# Patient Record
Sex: MALE
[De-identification: names, ages, dates, MRNs, and addresses within clinical notes are randomized per-mention and may not be internally consistent; named-entity substitution may affect disease eponyms.]

---

## 2020-11-18 ENCOUNTER — HOSPITAL ENCOUNTER (EMERGENCY)
Dept: HOSPITAL 97 - ER | Age: 30
Discharge: HOME | End: 2020-11-18
Payer: OTHER GOVERNMENT

## 2020-11-18 VITALS — OXYGEN SATURATION: 99 % | TEMPERATURE: 98.3 F | DIASTOLIC BLOOD PRESSURE: 99 MMHG | SYSTOLIC BLOOD PRESSURE: 136 MMHG
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
## 2020-11-18 DIAGNOSIS — S02.641A: Primary | ICD-10-CM

## 2020-11-18 DIAGNOSIS — S02.85XA: ICD-10-CM
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
## 2020-11-18 PROCEDURE — 70486 CT MAXILLOFACIAL W/O DYE: CPT

## 2020-11-18 PROCEDURE — 76377 3D RENDER W/INTRP POSTPROCES: CPT

## 2020-11-18 PROCEDURE — 72125 CT NECK SPINE W/O DYE: CPT

## 2020-11-18 PROCEDURE — 99283 EMERGENCY DEPT VISIT LOW MDM: CPT

## 2020-11-18 PROCEDURE — 70450 CT HEAD/BRAIN W/O DYE: CPT
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
## 2020-11-18 NOTE — EDPHYS
Physician Documentation                                                                           

 [facility]                                                                 

Name: Lucas Soto                                                                                

Age: 30 yrs                                                                                       

Sex: Male                                                                                         

: 1990                                                                                   

MRN: [id number]                                                                                   

Arrival Date: 2020                                                                          

Time: 13:11                                                                                       

Account#: [id number]                                                                            

Bed 29                                                                                            

Private MD:                                                                                       

ED Physician Jason Caldwell                                                                         

HPI:                                                                                              

                                                                                             

15:58 This 30 yrs old  Male presents to ER via Ambulatory with complaints of Facial   kb  

      Injury, Facial Swelling.                                                                    

15:58 The patient or guardian reports decreased movement, pain, swelling, tenderness. The     kb  

      complaints affect the right side of head. Context of injury: The problem was sustained      

      at work, resulted from a direct blow, a heavy object. Onset: The symptoms/episode           

      began/occurred 6 day(s) ago. Associated signs and symptoms: Loss of consciousness: This     

      patient did not experience any loss of consciousness. Pertinent positives: injury.          

      Severity of symptoms: At their worst the symptoms were moderate, in the emergency           

      department the symptoms are unchanged. The patient has not experienced similar symptoms     

      in the past. The patient has not recently seen a physician.                                 

                                                                                                  

Historical:                                                                                       

- Allergies:                                                                                      

13:21 No Known Allergies;                                                                     dm5 

                                                                                                  

                                                                                                  

                                                                                                  

ROS:                                                                                              

15:55 Constitutional: Negative for fever, chills, and weight loss, Cardiovascular: Negative   kb  

      for chest pain, palpitations, and edema, Respiratory: Negative for shortness of breath,     

      cough, wheezing, and pleuritic chest pain, Abdomen/GI: Negative for abdominal pain,         

      nausea, vomiting, diarrhea, and constipation, Back: Negative for injury and pain,           

      MS/Extremity: Negative for injury and deformity, Skin: Negative for injury, rash, and       

      discoloration, Neuro: Negative for headache, weakness, numbness, tingling, and seizure.     

15:55 ENT: Positive for right facial pain.                                                        

                                                                                                  

Exam:                                                                                             

15:55 Constitutional:  This is a well developed, well nourished patient who is awake, alert,  kb  

      and in no acute distress. Chest/axilla:  Normal chest wall appearance and motion.           

      Nontender with no deformity.  No lesions are appreciated. Cardiovascular:  Regular rate     

      and rhythm with a normal S1 and S2.  No gallops, murmurs, or rubs.  Normal PMI, no JVD.     

       No pulse deficits. Respiratory:  Lungs have equal breath sounds bilaterally, clear to      

      auscultation and percussion.  No rales, rhonchi or wheezes noted.  No increased work of     

      breathing, no retractions or nasal flaring. Abdomen/GI:  Soft, non-tender, with normal      

      bowel sounds.  No distension or tympany.  No guarding or rebound.  No evidence of           

      tenderness throughout. Back:  No spinal tenderness.  No costovertebral tenderness.          

      Full range of motion. Skin:  Warm, dry with normal turgor.  Normal color with no            

      rashes, no lesions, and no evidence of cellulitis. MS/ Extremity:  Pulses equal, no         

      cyanosis.  Neurovascular intact.  Full, normal range of motion. Neuro:  Awake and           

      alert, GCS 15, oriented to person, place, time, and situation.  Cranial nerves II-XII       

      grossly intact.  Motor strength 5/5 in all extremities.  Sensory grossly intact.            

      Cerebellar exam normal.  Normal gait.                                                       

15:55 Head/face: Noted is no obvious of injury or deformity except swelling, that is mild,        

      that is moderate, of the  right side of head, tenderness, that is moderate, of the          

      right side of head.                                                                         

                                                                                                  

Vital Signs:                                                                                      

13:18  / 99; Pulse 89; Resp 20; Temp 98.3; Pulse Ox 99% ; Weight 104.33 kg; Height 5    dm5 

      ft. 11 in. (180.34 cm); Pain 5/10;                                                          

13:18 Body Mass Index 32.08 (104.33 kg, 180.34 cm)                                            dm5 

                                                                                                  

Sydnie Coma Score:                                                                               

15:45 Eye Response: spontaneous(4). Verbal Response: oriented(5). Motor Response: obeys       kb  

      commands(6). Total: 15.                                                                     

15:58 Eye Response: spontaneous(4). Verbal Response: oriented(5). Motor Response: obeys       kb  

      commands(6). Total: 15.                                                                     

                                                                                                  

MDM:                                                                                              

15:40 Patient medically screened.                                                             kb  

15:45 Data reviewed: vital signs, nurses notes. Data interpreted: Pulse oximetry: on room air kb  

      is 99 %. Interpretation: normal. Counseling: I had a detailed discussion with the           

      patient and/or guardian regarding: the historical points, exam findings, and any            

      diagnostic results supporting the discharge/admit diagnosis, radiology results, the         

      need for outpatient follow up, OMF, to return to the emergency department if symptoms       

      worsen or persist or if there are any questions or concerns that arise at home.             

                                                                                                  

                                                                                             

13:22 Order name: Facial Bones W/O Con CT; Complete Time: 14:31                               dm5 

                                                                                             

13:39 Order name: Head C Spine Mpr Wo Con; Complete Time: 14:31                               EDMS

                                                                                                  

Administered Medications:                                                                         

No medications were administered                                                                  

                                                                                                  

                                                                                                  

Disposition:                                                                                      

18:58 Co-signature as Attending Physician, Jason Caldwell MD.                                    rn  

                                                                                                  

Disposition:                                                                                      

20 15:48 Discharged to Home. Impression: Fracture of ramus of mandible, Right Superior      

  orbital rim fracture.                                                                           

- Condition is Stable.                                                                            

- Discharge Instructions: Mandibular Fracture, Easy-to-Read.                                      

- Prescriptions for Ibuprofen 800 mg Oral Tablet - take 1 tablet by ORAL route every 8            

  hours As needed take with food; 30 tablet. Tramadol 50 mg Oral Tablet - take 1 tablet           

  by ORAL route every 8 hours as needed; 12 tablet.                                               

- Medication Reconciliation Form, Thank You Letter, Antibiotic Education, Prescription            

  Opioid Use form.                                                                                

- Follow up: Emergency Department; When: As needed; Reason: Worsening of condition.               

  Follow up: Private Physician; When: 2 - 3 days; Reason: Recheck today's complaints,             

  Continuance of care, Re-evaluation by your physician.                                           

                                                                                                  

                                                                                                  

                                                                                                  

Signatures:                                                                                       

Dispatcher [employer]                           [facility]                                                 

Lauryn De Los Santos FNP-C FNP-Ckb Markwardt, Deana, RN                    RN   dmChelly Harris, RN                    Jason Canales MD MD   rn                                                   

                                                                                                  

Corrections: (The following items were deleted from the chart)                                    

13:39 13:23 Head C Spine Cap Wo Con+CT.RAD.BRZ ordered. [facility]

15:54 15:48 2020 15:48 Discharged to Home. Impression: Fracture of ramus of mandible;   sv  

      Right Superior orbital rim fracture. Condition is Stable. Forms are Medication              

      Reconciliation Form, Thank You Letter, Antibiotic Education, Prescription Opioid Use.       

      Follow up: Emergency Department; When: As needed; Reason: Worsening of condition.           

      Follow up: Private Physician; When: 2 - 3 days; Reason: Recheck today's complaints,         

      Continuance of care, Re-evaluation by your physician. kb                                    

                                                                                                  

**************************************************************************************************
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
